# Patient Record
Sex: FEMALE | Race: WHITE | NOT HISPANIC OR LATINO
[De-identification: names, ages, dates, MRNs, and addresses within clinical notes are randomized per-mention and may not be internally consistent; named-entity substitution may affect disease eponyms.]

---

## 2017-03-01 ENCOUNTER — APPOINTMENT (OUTPATIENT)
Dept: OBGYN | Facility: CLINIC | Age: 62
End: 2017-03-01

## 2017-03-01 VITALS
HEIGHT: 64 IN | DIASTOLIC BLOOD PRESSURE: 80 MMHG | SYSTOLIC BLOOD PRESSURE: 120 MMHG | WEIGHT: 138 LBS | BODY MASS INDEX: 23.56 KG/M2

## 2017-03-01 LAB
BILIRUB UR QL STRIP: NORMAL
COLLECTION METHOD: NORMAL
GLUCOSE UR-MCNC: NORMAL
HCG UR QL: 0.2 EU/DL
HEMOCCULT SP1 STL QL: NEGATIVE
HGB UR QL STRIP.AUTO: NORMAL
KETONES UR-MCNC: NORMAL
LEUKOCYTE ESTERASE UR QL STRIP: ABNORMAL
NITRITE UR QL STRIP: NORMAL
PH UR STRIP: 6.5
PROT UR STRIP-MCNC: NORMAL
SP GR UR STRIP: 1.02

## 2017-03-01 RX ORDER — CICLOPIROX OLAMINE 7.7 MG/G
0.77 CREAM TOPICAL
Qty: 30 | Refills: 0 | Status: DISCONTINUED | COMMUNITY
Start: 2017-01-31

## 2017-03-01 RX ORDER — ZOLPIDEM TARTRATE 10 MG/1
10 TABLET ORAL
Qty: 30 | Refills: 0 | Status: ACTIVE | COMMUNITY
Start: 2017-01-13

## 2017-03-01 RX ORDER — DESONIDE 0.5 MG/G
0.05 CREAM TOPICAL
Qty: 15 | Refills: 0 | Status: DISCONTINUED | COMMUNITY
Start: 2017-01-31

## 2017-03-01 RX ORDER — ALPRAZOLAM 0.25 MG/1
0.25 TABLET ORAL
Qty: 30 | Refills: 0 | Status: DISCONTINUED | COMMUNITY
Start: 2017-01-06

## 2017-03-06 LAB — BACTERIA UR CULT: NORMAL

## 2018-03-20 ENCOUNTER — APPOINTMENT (OUTPATIENT)
Dept: OBGYN | Facility: CLINIC | Age: 63
End: 2018-03-20
Payer: COMMERCIAL

## 2018-03-20 VITALS
BODY MASS INDEX: 22.88 KG/M2 | SYSTOLIC BLOOD PRESSURE: 140 MMHG | HEIGHT: 64 IN | DIASTOLIC BLOOD PRESSURE: 80 MMHG | WEIGHT: 134 LBS

## 2018-03-20 LAB
BILIRUB UR QL STRIP: NORMAL
COLLECTION METHOD: NORMAL
GLUCOSE UR-MCNC: NORMAL
HCG UR QL: 0.2 EU/DL
HGB UR QL STRIP.AUTO: NORMAL
KETONES UR-MCNC: NORMAL
LEUKOCYTE ESTERASE UR QL STRIP: NORMAL
NITRITE UR QL STRIP: NORMAL
PH UR STRIP: 5.5
PROT UR STRIP-MCNC: NORMAL
SP GR UR STRIP: 1.01

## 2018-03-20 PROCEDURE — 81003 URINALYSIS AUTO W/O SCOPE: CPT | Mod: QW

## 2018-03-20 PROCEDURE — 99396 PREV VISIT EST AGE 40-64: CPT

## 2018-03-20 RX ORDER — ESCITALOPRAM OXALATE 10 MG/1
10 TABLET ORAL
Qty: 60 | Refills: 0 | Status: DISCONTINUED | COMMUNITY
Start: 2017-02-06 | End: 2018-03-20

## 2018-03-26 LAB — CYTOLOGY CVX/VAG DOC THIN PREP: NORMAL

## 2019-07-10 ENCOUNTER — APPOINTMENT (OUTPATIENT)
Dept: OBGYN | Facility: CLINIC | Age: 64
End: 2019-07-10
Payer: MEDICARE

## 2019-07-10 VITALS
SYSTOLIC BLOOD PRESSURE: 124 MMHG | BODY MASS INDEX: 23.1 KG/M2 | HEIGHT: 63.5 IN | DIASTOLIC BLOOD PRESSURE: 80 MMHG | WEIGHT: 132 LBS

## 2019-07-10 PROCEDURE — G0101: CPT

## 2019-07-10 RX ORDER — TRIAMCINOLONE ACETONIDE 1 MG/G
0.1 CREAM TOPICAL
Qty: 30 | Refills: 0 | Status: DISCONTINUED | COMMUNITY
Start: 2017-01-31 | End: 2019-07-10

## 2019-07-10 NOTE — REVIEW OF SYSTEMS
[Sleep Disturbances] : sleep disturbances [Nl] : Musculoskeletal [Abdominal Pain] : no abdominal pain [FreeTextEntry1] : ANTIBIOTICS LAST YEAR WITH STOMACH PAIN, RESOLVED NOW

## 2019-07-10 NOTE — PHYSICAL EXAM
[Alert] : alert [Awake] : awake [Soft] : soft [Oriented x3] : oriented to person, place, and time [Vulvar Atrophy] : vulvar atrophy [Normal] : uterus [Atrophy] : atrophy [Uterine Adnexae] : were not tender and not enlarged [No Bleeding] : there was no active vaginal bleeding [Mass] : no breast mass [Acute Distress] : no acute distress [Nipple Discharge] : no nipple discharge [Axillary LAD] : no axillary lymphadenopathy [Tender] : non tender [FreeTextEntry9] : 2018 COLONOSCOPY

## 2019-07-10 NOTE — HISTORY OF PRESENT ILLNESS
[1 Year Ago] : 1 year ago [Fair] : being in fair health [Healthy Diet] : a healthy diet [Last Mammogram ___] : Last Mammogram was [unfilled] [Last Bone Density ___] : Last bone density studies [unfilled] [Last Pap ___] : Last cervical pap smear was [unfilled] [Last Colonoscopy ___] : Last colonoscopy [unfilled] [Postmenopausal] : is postmenopausal [Definite:  ___ (Date)] : the last menstrual period was [unfilled] [Currently In Menopause] : currently in menopause [Regular Exercise] : not exercising regularly [Weight Concerns] : no concerns with her weight [de-identified] : WHEAT FREE, LACTOSE FREE, INJECTIONS FOR PSORIATIC ARTHRITIS, STARTED GOLF [Night Sweats] : no night sweats [Hot Flashes] : no hot flashes [Experiencing Menopausal Sxs] : not experiencing menopausal symptoms [Sexually Active] : is not sexually active [de-identified] : NOT DATING NOW

## 2020-12-03 ENCOUNTER — OUTPATIENT (OUTPATIENT)
Dept: OUTPATIENT SERVICES | Facility: HOSPITAL | Age: 65
LOS: 1 days | End: 2020-12-03
Payer: MEDICARE

## 2020-12-03 DIAGNOSIS — Z11.59 ENCOUNTER FOR SCREENING FOR OTHER VIRAL DISEASES: ICD-10-CM

## 2020-12-03 LAB — SARS-COV-2 RNA SPEC QL NAA+PROBE: SIGNIFICANT CHANGE UP

## 2020-12-03 PROCEDURE — U0003: CPT

## 2020-12-04 DIAGNOSIS — Z11.59 ENCOUNTER FOR SCREENING FOR OTHER VIRAL DISEASES: ICD-10-CM

## 2020-12-22 ENCOUNTER — OUTPATIENT (OUTPATIENT)
Dept: OUTPATIENT SERVICES | Facility: HOSPITAL | Age: 65
LOS: 1 days | End: 2020-12-22
Payer: MEDICARE

## 2020-12-22 DIAGNOSIS — Z20.828 CONTACT WITH AND (SUSPECTED) EXPOSURE TO OTHER VIRAL COMMUNICABLE DISEASES: ICD-10-CM

## 2020-12-22 LAB — SARS-COV-2 RNA SPEC QL NAA+PROBE: SIGNIFICANT CHANGE UP

## 2020-12-22 PROCEDURE — U0003: CPT

## 2020-12-22 PROCEDURE — C9803: CPT

## 2020-12-23 DIAGNOSIS — Z20.828 CONTACT WITH AND (SUSPECTED) EXPOSURE TO OTHER VIRAL COMMUNICABLE DISEASES: ICD-10-CM

## 2021-01-14 ENCOUNTER — OUTPATIENT (OUTPATIENT)
Dept: OUTPATIENT SERVICES | Facility: HOSPITAL | Age: 66
LOS: 1 days | End: 2021-01-14
Payer: MEDICARE

## 2021-01-14 DIAGNOSIS — Z20.828 CONTACT WITH AND (SUSPECTED) EXPOSURE TO OTHER VIRAL COMMUNICABLE DISEASES: ICD-10-CM

## 2021-01-14 LAB — SARS-COV-2 RNA SPEC QL NAA+PROBE: SIGNIFICANT CHANGE UP

## 2021-01-14 PROCEDURE — U0005: CPT

## 2021-01-14 PROCEDURE — U0003: CPT

## 2021-01-14 PROCEDURE — C9803: CPT

## 2021-01-15 DIAGNOSIS — Z20.828 CONTACT WITH AND (SUSPECTED) EXPOSURE TO OTHER VIRAL COMMUNICABLE DISEASES: ICD-10-CM

## 2021-01-19 ENCOUNTER — OUTPATIENT (OUTPATIENT)
Dept: OUTPATIENT SERVICES | Facility: HOSPITAL | Age: 66
LOS: 1 days | End: 2021-01-19
Payer: MEDICARE

## 2021-01-19 DIAGNOSIS — Z20.828 CONTACT WITH AND (SUSPECTED) EXPOSURE TO OTHER VIRAL COMMUNICABLE DISEASES: ICD-10-CM

## 2021-01-19 LAB — SARS-COV-2 RNA SPEC QL NAA+PROBE: SIGNIFICANT CHANGE UP

## 2021-01-19 PROCEDURE — U0005: CPT

## 2021-01-19 PROCEDURE — U0003: CPT

## 2021-01-19 PROCEDURE — C9803: CPT

## 2021-01-20 DIAGNOSIS — Z20.828 CONTACT WITH AND (SUSPECTED) EXPOSURE TO OTHER VIRAL COMMUNICABLE DISEASES: ICD-10-CM

## 2021-05-12 ENCOUNTER — APPOINTMENT (OUTPATIENT)
Dept: OBGYN | Facility: CLINIC | Age: 66
End: 2021-05-12
Payer: MEDICARE

## 2021-05-12 VITALS
HEIGHT: 64 IN | TEMPERATURE: 98 F | BODY MASS INDEX: 22.71 KG/M2 | WEIGHT: 133 LBS | SYSTOLIC BLOOD PRESSURE: 120 MMHG | DIASTOLIC BLOOD PRESSURE: 80 MMHG

## 2021-05-12 PROCEDURE — 99214 OFFICE O/P EST MOD 30 MIN: CPT

## 2021-05-12 NOTE — HISTORY OF PRESENT ILLNESS
[FreeTextEntry1] : C/O VAGINAL DRYNESS.  HAS NOT USED ESTROGEN CREAM RECENTLY.  \par \par EXERCISING, PELOTON, ON VITAMIN REGIME, EATING WELL.  OFF AMBIEN.  \par \par THINKS MORE RECENT DEXA AT DR. ROWE, NOT ON CHART.   [Patient reported mammogram was normal] : Patient reported mammogram was normal [Patient reported PAP Smear was normal] : Patient reported PAP Smear was normal [Patient reported bone density results were abnormal] : Patient reported bone density results were abnormal [Patient reported colonoscopy was normal] : Patient reported colonoscopy was normal [Mammogramdate] : 9/2020 [PapSmeardate] : 3/2018 [BoneDensityDate] : 4/2018 [TextBox_37] : STABLE OSTEOPENIA [ColonoscopyDate] : 2019 [TextBox_43] : PLACIDO POLYPS [No] : Patient does not have concerns regarding sex [Previously active] : previously active

## 2021-05-12 NOTE — PHYSICAL EXAM
[Appropriately responsive] : appropriately responsive [Alert] : alert [No Acute Distress] : no acute distress [No Murmurs] : no murmurs [Soft] : soft [Non-tender] : non-tender [Non-distended] : non-distended [No HSM] : No HSM [No Lesions] : no lesions [No Mass] : no mass [Oriented x3] : oriented x3 [Examination Of The Breasts] : a normal appearance [No Masses] : no breast masses were palpable [Vulvar Atrophy] : vulvar atrophy [Labia Majora] : normal [Labia Minora] : normal [Atrophy] : atrophy [Normal] : normal [No Tenderness] : no tenderness [Uterine Adnexae] : normal [Nl Sphincter Tone] : normal sphincter tone [FreeTextEntry9] : GUAIAC NEGATIVE

## 2021-05-19 LAB — CYTOLOGY CVX/VAG DOC THIN PREP: ABNORMAL

## 2021-10-01 ENCOUNTER — TRANSCRIPTION ENCOUNTER (OUTPATIENT)
Age: 66
End: 2021-10-01

## 2022-05-16 ENCOUNTER — APPOINTMENT (OUTPATIENT)
Dept: OBGYN | Facility: CLINIC | Age: 67
End: 2022-05-16
Payer: MEDICARE

## 2022-05-16 ENCOUNTER — NON-APPOINTMENT (OUTPATIENT)
Age: 67
End: 2022-05-16

## 2022-05-16 VITALS — DIASTOLIC BLOOD PRESSURE: 74 MMHG | SYSTOLIC BLOOD PRESSURE: 118 MMHG | HEIGHT: 64 IN

## 2022-05-16 DIAGNOSIS — R19.5 OTHER FECAL ABNORMALITIES: ICD-10-CM

## 2022-05-16 PROCEDURE — G0101: CPT

## 2022-05-16 NOTE — HISTORY OF PRESENT ILLNESS
[postmenopausal] : postmenopausal [N] : Patient does not use contraception [Y] : Patient is sexually active [No] : Patient does not have concerns regarding sex [Currently Active] : currently active [Mammogramdate] : 01/04/2022 [TextBox_19] : BR2 [BreastSonogramDate] : 1/2022 [TextBox_25] : DENSE BREASTS [PapSmeardate] : 05/12/2021 [TextBox_31] : NEGATIVE [BoneDensityDate] : 07/30/2020 [ColonoscopyDate] : 2022 [LMPDate] : 2007 [FreeTextEntry1] : 2007

## 2023-05-22 ENCOUNTER — APPOINTMENT (OUTPATIENT)
Dept: OBGYN | Facility: CLINIC | Age: 68
End: 2023-05-22
Payer: MEDICARE

## 2023-05-22 ENCOUNTER — NON-APPOINTMENT (OUTPATIENT)
Age: 68
End: 2023-05-22

## 2023-05-22 VITALS — WEIGHT: 136 LBS | HEIGHT: 63 IN | BODY MASS INDEX: 24.1 KG/M2

## 2023-05-22 DIAGNOSIS — M85.80 OTHER SPECIFIED DISORDERS OF BONE DENSITY AND STRUCTURE, UNSPECIFIED SITE: ICD-10-CM

## 2023-05-22 PROCEDURE — 99214 OFFICE O/P EST MOD 30 MIN: CPT

## 2023-05-22 RX ORDER — ESTRADIOL 0.1 MG/G
0.1 CREAM VAGINAL
Qty: 3 | Refills: 3 | Status: ACTIVE | COMMUNITY
Start: 2023-05-22 | End: 1900-01-01

## 2023-05-22 NOTE — HISTORY OF PRESENT ILLNESS
[TextBox_4] : ANNUAL [Mammogramdate] : 2/9/23 [TextBox_19] : BR 2 [TextBox_25] : BR 2 [PapSmeardate] : 5/12/21 [TextBox_31] : ATROPHIC [BoneDensityDate] : 7/30/20 [TextBox_37] : OSTEOPENIA [ColonoscopyDate] : 2022 [LMPDate] : 2007 [TextBox_6] : 2007 [FreeTextEntry1] : 2007 [Previously active] : previously active [No] : No

## 2023-05-22 NOTE — PLAN
[FreeTextEntry1] : ATROPHIC VAGINITIS, SYMPTOMATIC.  TRIAL OF E2 VAGINAL CREAM FOR VULVAR APPLICATION.\par \par OSTEOPENIA, FOR F/U, LAST 2020, DR. ROWE.  NO LONGER SEEING DR. ROWE.  DEXA ORDERED.\par \par DENSE BREASTS, SONOGRAM WITH MAMMOGRAM AT 2/2024.

## 2023-11-16 DIAGNOSIS — M41.80 OTHER FORMS OF SCOLIOSIS, SITE UNSPECIFIED: ICD-10-CM

## 2024-04-04 ENCOUNTER — NON-APPOINTMENT (OUTPATIENT)
Age: 69
End: 2024-04-04

## 2024-05-31 ENCOUNTER — APPOINTMENT (OUTPATIENT)
Dept: OBGYN | Facility: CLINIC | Age: 69
End: 2024-05-31
Payer: MEDICARE

## 2024-05-31 VITALS
HEIGHT: 63 IN | BODY MASS INDEX: 25.36 KG/M2 | SYSTOLIC BLOOD PRESSURE: 130 MMHG | DIASTOLIC BLOOD PRESSURE: 74 MMHG | WEIGHT: 143.13 LBS

## 2024-05-31 DIAGNOSIS — Z01.411 ENCOUNTER FOR GYNECOLOGICAL EXAMINATION (GENERAL) (ROUTINE) WITH ABNORMAL FINDINGS: ICD-10-CM

## 2024-05-31 DIAGNOSIS — Z87.898 PERSONAL HISTORY OF OTHER SPECIFIED CONDITIONS: ICD-10-CM

## 2024-05-31 DIAGNOSIS — Z01.419 ENCOUNTER FOR GYNECOLOGICAL EXAMINATION (GENERAL) (ROUTINE) W/OUT ABNORMAL FINDINGS: ICD-10-CM

## 2024-05-31 DIAGNOSIS — Z12.11 ENCOUNTER FOR SCREENING FOR MALIGNANT NEOPLASM OF COLON: ICD-10-CM

## 2024-05-31 DIAGNOSIS — N90.5 ATROPHY OF VULVA: ICD-10-CM

## 2024-05-31 DIAGNOSIS — N89.8 OTHER SPECIFIED NONINFLAMMATORY DISORDERS OF VAGINA: ICD-10-CM

## 2024-05-31 DIAGNOSIS — R92.30 DENSE BREASTS, UNSPECIFIED: ICD-10-CM

## 2024-05-31 DIAGNOSIS — R82.998 OTHER ABNORMAL FINDINGS IN URINE: ICD-10-CM

## 2024-05-31 DIAGNOSIS — N95.2 POSTMENOPAUSAL ATROPHIC VAGINITIS: ICD-10-CM

## 2024-05-31 DIAGNOSIS — Z12.31 ENCOUNTER FOR SCREENING MAMMOGRAM FOR MALIGNANT NEOPLASM OF BREAST: ICD-10-CM

## 2024-05-31 DIAGNOSIS — Z12.39 ENCOUNTER FOR OTHER SCREENING FOR MALIGNANT NEOPLASM OF BREAST: ICD-10-CM

## 2024-05-31 PROCEDURE — 99459 PELVIC EXAMINATION: CPT

## 2024-05-31 PROCEDURE — 99214 OFFICE O/P EST MOD 30 MIN: CPT | Mod: 25

## 2024-05-31 PROCEDURE — G0101: CPT

## 2024-05-31 RX ORDER — ESTRADIOL 0.1 MG/G
0.1 CREAM VAGINAL
Qty: 1 | Refills: 3 | Status: ACTIVE | COMMUNITY
Start: 2024-05-31 | End: 1900-01-01

## 2024-05-31 RX ORDER — ESTRADIOL 10 UG/1
10 TABLET, FILM COATED VAGINAL
Qty: 32 | Refills: 3 | Status: ACTIVE | COMMUNITY
Start: 2024-05-31 | End: 1900-01-01

## 2024-06-02 PROBLEM — R82.998 LEUKOCYTES IN URINE: Status: RESOLVED | Noted: 2017-03-01 | Resolved: 2024-06-02

## 2024-06-02 PROBLEM — Z12.39 BREAST SCREENING: Status: RESOLVED | Noted: 2021-05-12 | Resolved: 2024-06-02

## 2024-06-02 PROBLEM — Z87.898 HISTORY OF URINARY URGENCY: Status: RESOLVED | Noted: 2018-03-20 | Resolved: 2024-06-02

## 2024-06-02 NOTE — END OF VISIT
[FreeTextEntry3] : I, Blake Doll, solely acted as scribe for Dr. Kristine Childress on 05/31/2024. All medical entries made by the Scribe were at my, Dr. Childress's, direction and personally dictated by me on 05/31/2024. I have reviewed the chart and agree that the record accurately reflects my personal performance of the history, physical exam, assessment and plan. I have also personally directed, reviewed, and agreed with the chart.

## 2024-06-02 NOTE — PHYSICAL EXAM
[Chaperone Present] : A chaperone was present in the examining room during all aspects of the physical examination [[id number]] : A chaperone was present during the pelvic exam. [Appropriately responsive] : appropriately responsive [Alert] : alert [No Acute Distress] : no acute distress [Oriented x3] : oriented x3 [Examination Of The Breasts] : a normal appearance [No Discharge] : no discharge [No Masses] : no breast masses were palpable [Labia Majora] : normal [Labia Minora] : normal [No Bleeding] : There was no active vaginal bleeding [Uterine Adnexae] : normal [Soft] : soft [Non-tender] : non-tender [Non-distended] : non-distended [Atrophy] : atrophy [Normal] : normal [Dry Mucosa] : dry mucosa

## 2024-06-02 NOTE — DISCUSSION/SUMMARY
[FreeTextEntry1] : Pap done today.   Prescription for mammogram screening and breast US given.  Prescription refill for Estradiol 0.1 mg/gm given.  We discussed that vaginal dryness and painful sex is secondary to vaginal atrophy, which is common in menopause, this is referred to as genitourinary syndrome of menopause.  We discussed menopausal changes in the vagina and bladder secondary to the significant reduction in estrogen. I explained that unlike the other symptoms of menopause that typically resolve over time, genitourinary syndrome of menopause typically worsens if left untreated.  We discussed the benefits and roles of local menopausal hormone therapy via vaginal estrogen. She is aware that there is a minimal to insignificant absorption into the bloodstream with vaginal estrogen. This is regarded as very safe by the FDA. We even discussed that ACOG recommends it as a treatment option even in women with a history of breast cancer.   Prescription was sent to the pharmacy for Yuvafem 10mcg. She is aware that for the first 2 weeks, she needs to insert the tablet vaginally nightly, and then after the first 2 weeks it is 2 times per week. She is aware that she may not have a significant improvement in symptoms for 3 months. If there is not significant improvement after 3 months, she was instructed to follow up. She is aware that she can also use both vaginal moisturizers and vaginal estrogen for added improvement. She is aware she would need to alternate the insertion of both, and I recommend against inserting both at the same time.   She is aware that if she has any vaginal bleeding after the first 3 months of initiating treatment, she should return for a uterine evaluation. She is also aware that she may still require lubrication for intercourse. Questions answered.  Self-breast exam reviewed.   She will follow up annually and as needed.

## 2024-06-02 NOTE — HISTORY OF PRESENT ILLNESS
[postmenopausal] : postmenopausal [N] : Patient denies prior pregnancies [No] : Patient does not have concerns regarding sex [Previously active] : previously active [Mammogramdate] : 04/04/2024 [BreastSonogramDate] : 04/04/2024 [TextBox_19] : BR2 [TextBox_25] : BR2 [PapSmeardate] : 05/12/2021 [TextBox_31] : NEGATIVE [BoneDensityDate] : 11/06/2023 [TextBox_37] : SCOLIOSIS [ColonoscopyDate] : 2022 [LMPDate] : 2007 [PGHxTotal] : 0 [FreeTextEntry1] : 2007

## 2024-06-03 LAB — HPV HIGH+LOW RISK DNA PNL CVX: NOT DETECTED

## 2024-06-06 LAB — CYTOLOGY CVX/VAG DOC THIN PREP: ABNORMAL

## 2025-06-04 ENCOUNTER — APPOINTMENT (OUTPATIENT)
Dept: OBGYN | Facility: CLINIC | Age: 70
End: 2025-06-04